# Patient Record
Sex: MALE | Race: WHITE | NOT HISPANIC OR LATINO | Employment: FULL TIME | URBAN - METROPOLITAN AREA
[De-identification: names, ages, dates, MRNs, and addresses within clinical notes are randomized per-mention and may not be internally consistent; named-entity substitution may affect disease eponyms.]

---

## 2023-03-16 ENCOUNTER — TELEMEDICINE (OUTPATIENT)
Dept: PRIMARY CARE | Facility: CLINIC | Age: 53
End: 2023-03-16
Payer: COMMERCIAL

## 2023-03-16 DIAGNOSIS — K52.9 GASTROENTERITIS: ICD-10-CM

## 2023-03-16 DIAGNOSIS — K21.9 GASTROESOPHAGEAL REFLUX DISEASE WITHOUT ESOPHAGITIS: ICD-10-CM

## 2023-03-16 DIAGNOSIS — K80.20 CALCULUS OF GALLBLADDER WITHOUT CHOLECYSTITIS WITHOUT OBSTRUCTION: Primary | ICD-10-CM

## 2023-03-16 DIAGNOSIS — E78.5 DYSLIPIDEMIA: ICD-10-CM

## 2023-03-16 PROBLEM — F52.21 ED (ERECTILE DYSFUNCTION) OF NON-ORGANIC ORIGIN: Status: ACTIVE | Noted: 2023-03-16

## 2023-03-16 PROBLEM — K27.9 PUD (PEPTIC ULCER DISEASE): Status: ACTIVE | Noted: 2023-03-16

## 2023-03-16 PROBLEM — I10 BENIGN ESSENTIAL HTN: Status: ACTIVE | Noted: 2023-03-16

## 2023-03-16 PROBLEM — R10.9 ABDOMINAL PAIN: Status: ACTIVE | Noted: 2023-03-12

## 2023-03-16 PROBLEM — K29.70 GASTRITIS: Status: ACTIVE | Noted: 2023-03-16

## 2023-03-16 PROBLEM — G47.33 OSA (OBSTRUCTIVE SLEEP APNEA): Status: ACTIVE | Noted: 2023-03-16

## 2023-03-16 PROBLEM — R10.13 EPIGASTRIC DISCOMFORT: Status: ACTIVE | Noted: 2023-03-16

## 2023-03-16 PROBLEM — E78.00 HYPERCHOLESTEROLEMIA: Chronic | Status: ACTIVE | Noted: 2023-03-12

## 2023-03-16 PROBLEM — R11.2 NAUSEA AND VOMITING: Status: ACTIVE | Noted: 2023-03-12

## 2023-03-16 PROCEDURE — 99214 OFFICE O/P EST MOD 30 MIN: CPT | Performed by: INTERNAL MEDICINE

## 2023-03-16 RX ORDER — METOPROLOL SUCCINATE 50 MG/1
1 TABLET, EXTENDED RELEASE ORAL DAILY
COMMUNITY
Start: 2023-02-03 | End: 2023-05-11

## 2023-03-16 RX ORDER — LOSARTAN POTASSIUM 50 MG/1
50 TABLET ORAL
COMMUNITY
End: 2023-05-11 | Stop reason: SDUPTHER

## 2023-03-16 RX ORDER — PANTOPRAZOLE SODIUM 40 MG/1
40 TABLET, DELAYED RELEASE ORAL 2 TIMES DAILY
Qty: 60 TABLET | Refills: 1 | Status: SHIPPED | OUTPATIENT
Start: 2023-03-16 | End: 2023-04-10

## 2023-03-16 RX ORDER — ROSUVASTATIN CALCIUM 10 MG/1
10 TABLET, COATED ORAL
COMMUNITY
Start: 2017-11-08 | End: 2023-08-30

## 2023-03-16 RX ORDER — SILDENAFIL CITRATE 20 MG/1
TABLET ORAL
COMMUNITY
Start: 2019-08-16 | End: 2023-11-21 | Stop reason: ALTCHOICE

## 2023-03-16 ASSESSMENT — ENCOUNTER SYMPTOMS
ABDOMINAL PAIN: 1
CONSTIPATION: 1
HEADACHES: 1
MYALGIAS: 1

## 2023-03-16 NOTE — PROGRESS NOTES
Answers submitted by the patient for this visit:  Abdominal Pain Questionnaire (Submitted on 3/16/2023)  Chief Complaint: Abdominal pain  Chronicity: new  Onset: in the past 7 days  Onset quality: sudden  Frequency: constantly  Episode duration: 96 Hours  Progression since onset: unchanged  Pain location: epigastric region  Pain - numeric: 5/10  Pain quality: aching, a sensation of fullness, sharp  Radiates to: RUQ, epigastric region, back  constipation: Yes  headaches: Yes  myalgias: Yes  Aggravated by: eating, movement, palpation  Relieved by: being still, bowel movements, recumbency  Diagnostic workup: CT scan, ultrasound

## 2023-03-16 NOTE — PROGRESS NOTES
Subjective   Mauro Alexander is a 52 y.o. male who presents for virtual visit abdominal pain.  HPI  Patient is here for virtual visit 52-year-old male with a known history of benign essential hypertension dyslipidemia patient is complaining of abdominal pain, visiting in [place] February 2023 status post eating cheese and various meats, his pain was rated 9/10 and 10/10 went to the emergency room was diagnosed with acute gastroenteritis, he was also noted to have cholelithiasis without inflammation of gallbladder walls.  He was discharged to follow with his PCP, he is able to eat with delayed emptying, requiring laxatives to go to the bathroom.  He denies fever chills admits to constipation or diarrhea admits to abdominal discomfort.    Review of Systems   10 system reviewed patient pertinent as above  Objective   Virtual visit  There were no vitals taken for this visit.   Physical Exam  Virtual visit      Assessment/Plan     Virtual visit    We discussed multiple problems    Acute gastroenteritis may have been due to infection  Possibly from food you have consumed in [place]  However the pain was severe I am concerned that she may have gallstone pancreatitis  Or just passed a gallstone    Cholelithiasis  We will refer you to surgery  Dr. Bob for evaluation    Gastroenteritis abdominal pain  Start Protonix 40 mg twice a day  Referral to gastroenterology  Dr. Deep Florence    Continue with the low-fat, low-cholesterol diet,  I recommended Mediterranean diet, which include fish, chicken, vegetables and olive oil  Exercise daily for 30 minutes at least 3 times a week  Continue home medications  per history    Hypertension  No added salt diet, do not and salt to your food  Try to exercise every other day for 30 minutes  Continue current medications      Problem List Items Addressed This Visit          Digestive    Calculus of gallbladder without cholecystitis without obstruction - Primary    Relevant  Orders    Referral to General Surgery    Gastroenteritis    Relevant Medications    pantoprazole (ProtoNix) 40 mg EC tablet    Other Relevant Orders    Referral to Gastroenterology    GERD (gastroesophageal reflux disease)       Other    Dyslipidemia         Harris Rahman MD

## 2023-04-03 ENCOUNTER — APPOINTMENT (OUTPATIENT)
Dept: PRIMARY CARE | Facility: CLINIC | Age: 53
End: 2023-04-03
Payer: COMMERCIAL

## 2023-04-03 PROBLEM — I47.29 NSVT (NONSUSTAINED VENTRICULAR TACHYCARDIA) (MULTI): Status: ACTIVE | Noted: 2023-04-03

## 2023-04-03 PROBLEM — D22.9 NUMEROUS MOLES: Status: ACTIVE | Noted: 2023-04-03

## 2023-04-03 PROBLEM — R35.1 NOCTURIA: Status: ACTIVE | Noted: 2023-04-03

## 2023-04-03 PROBLEM — I49.3 PVC (PREMATURE VENTRICULAR CONTRACTION): Status: ACTIVE | Noted: 2023-04-03

## 2023-04-03 PROBLEM — R00.2 HEART PALPITATIONS: Status: ACTIVE | Noted: 2023-04-03

## 2023-04-03 PROBLEM — K63.5 COLON POLYPS: Status: ACTIVE | Noted: 2023-04-03

## 2023-04-03 PROBLEM — Z87.898 HISTORY OF SNORING: Status: ACTIVE | Noted: 2023-04-03

## 2023-04-03 PROBLEM — R00.2 PALPITATION: Status: ACTIVE | Noted: 2023-04-03

## 2023-04-03 PROBLEM — R07.9 CHEST PAIN: Status: ACTIVE | Noted: 2023-04-03

## 2023-04-03 PROBLEM — L98.9 SKIN LESION: Status: ACTIVE | Noted: 2023-04-03

## 2023-04-03 PROBLEM — E55.9 VITAMIN D INSUFFICIENCY: Status: ACTIVE | Noted: 2023-04-03

## 2023-04-03 RX ORDER — TADALAFIL 10 MG/1
10 TABLET ORAL DAILY
COMMUNITY
End: 2023-05-11 | Stop reason: SDUPTHER

## 2023-04-23 DIAGNOSIS — K52.9 GASTROENTERITIS: ICD-10-CM

## 2023-05-01 RX ORDER — PANTOPRAZOLE SODIUM 40 MG/1
TABLET, DELAYED RELEASE ORAL
Qty: 180 TABLET | Refills: 1 | Status: SHIPPED | OUTPATIENT
Start: 2023-05-01 | End: 2023-11-21 | Stop reason: ALTCHOICE

## 2023-05-09 NOTE — PROGRESS NOTES
Subjective   Mauro Alexander is a 52 y.o. male who presents for patient is here for a follow-up.  HPI  53-year-old male with known history of essential hypertension, dyslipidemia history of cholelithiasis was seen by surgery Dr. Bob, planning to proceed with laparoscopic cholecystectomy.  Reports no major medical denies chest pain shortness of breath fever chill nausea vomiting constipation diarrhea dysuria urgency frequency, takes medication as prescribed.  Review of Systems  10 system review pertinent as above  Objective     Visit Vitals  /80   Pulse 78   Temp 36.7 °C (98.1 °F)   Resp 14      Physical Exam  HEENT: Atraumatic normocephalic the pupils are equal and round and reactive to light the sclerae nonicteric extraocular motion are intact.  Neck: Is supple without JVD no carotid bruits the trachea is midline there are no masses pulses are equal and bilateral with normal upstroke.  Skin: Normal.  Skin good texture.  Moist.  Good turgor.  No lesions, no rashes.  Lymph: No lymphadenopathy appreciated, no masses, no lesions  Lungs: Are clear to auscultation and percussion, good breath sounds bilaterally, no rhonchi, no wheezing, good diaphragmatic excursion.  Heart: Normal rate and normal rhythm S1, S2, no S3, no gallop, murmur or rub.  Abdomen: Soft, nontender, no organomegaly, good bowel sounds.    Extremities: Full range of motion, good pulses bilateral.  No cyanosis, no clubbing or edema.  Neuro: Cranial nerves II-XII are grossly intact there is no sensory or motor deficits.  Able to move all extremities.      Assessment/Plan   FBW    CBC BMP LIPIDS AST ALT Vitamin d and PSA    Acute cholelithiasis  Was seen by surgery Dr. Bob  The plan is to proceed with laparoscopic cholecystectomy  June 2023    Continue with the low-fat, low-cholesterol diet,  I recommended Mediterranean diet, which include fish, chicken, vegetables and olive oil  Exercise daily for 30 minutes at least 3 times a week  Continue  home medications    Hypertension  No added salt diet, do not and salt to your food  Try to exercise every other day for 30 minutes  Continue current medications    Gastroesophageal reflux disease  Continue current medications  Include spicy foods do not eat late at night  Do not wear tight fitting clothes  Exercise daily    SHANIKA  Nasal bipap    BMI 32.69  LSM weight loss    GERD Stable  Stopped Pantoprazole          Problem List Items Addressed This Visit          Nervous    SHANIKA (obstructive sleep apnea) - Primary    Relevant Orders    Basic Metabolic Panel    CBC       Circulatory    Benign essential HTN    Relevant Medications    losartan (Cozaar) 50 mg tablet    Other Relevant Orders    Basic Metabolic Panel       Digestive    GERD (gastroesophageal reflux disease)    Relevant Orders    CBC       Other    Dyslipidemia    Relevant Orders    Lipid Panel    AST    ALT    Nocturia    Relevant Orders    PSA     Other Visit Diagnoses       Vitamin D deficiency        Relevant Orders    Vitamin D 25-Hydroxy,Total              Harris Rahman MD

## 2023-05-11 ENCOUNTER — OFFICE VISIT (OUTPATIENT)
Dept: PRIMARY CARE | Facility: CLINIC | Age: 53
End: 2023-05-11
Payer: COMMERCIAL

## 2023-05-11 VITALS
TEMPERATURE: 98.1 F | RESPIRATION RATE: 14 BRPM | HEIGHT: 72 IN | DIASTOLIC BLOOD PRESSURE: 80 MMHG | BODY MASS INDEX: 32.64 KG/M2 | HEART RATE: 78 BPM | WEIGHT: 241 LBS | SYSTOLIC BLOOD PRESSURE: 118 MMHG

## 2023-05-11 DIAGNOSIS — E78.5 DYSLIPIDEMIA: ICD-10-CM

## 2023-05-11 DIAGNOSIS — E55.9 VITAMIN D DEFICIENCY: ICD-10-CM

## 2023-05-11 DIAGNOSIS — N52.9 ERECTILE DYSFUNCTION, UNSPECIFIED ERECTILE DYSFUNCTION TYPE: ICD-10-CM

## 2023-05-11 DIAGNOSIS — G47.33 OSA (OBSTRUCTIVE SLEEP APNEA): Primary | ICD-10-CM

## 2023-05-11 DIAGNOSIS — I10 BENIGN ESSENTIAL HTN: ICD-10-CM

## 2023-05-11 DIAGNOSIS — K21.9 GASTROESOPHAGEAL REFLUX DISEASE WITHOUT ESOPHAGITIS: ICD-10-CM

## 2023-05-11 DIAGNOSIS — R35.1 NOCTURIA: ICD-10-CM

## 2023-05-11 PROCEDURE — 3074F SYST BP LT 130 MM HG: CPT | Performed by: INTERNAL MEDICINE

## 2023-05-11 PROCEDURE — 80061 LIPID PANEL: CPT | Performed by: INTERNAL MEDICINE

## 2023-05-11 PROCEDURE — 82306 VITAMIN D 25 HYDROXY: CPT | Performed by: INTERNAL MEDICINE

## 2023-05-11 PROCEDURE — 84450 TRANSFERASE (AST) (SGOT): CPT | Performed by: INTERNAL MEDICINE

## 2023-05-11 PROCEDURE — 85025 COMPLETE CBC W/AUTO DIFF WBC: CPT | Performed by: INTERNAL MEDICINE

## 2023-05-11 PROCEDURE — 3079F DIAST BP 80-89 MM HG: CPT | Performed by: INTERNAL MEDICINE

## 2023-05-11 PROCEDURE — 84460 ALANINE AMINO (ALT) (SGPT): CPT | Performed by: INTERNAL MEDICINE

## 2023-05-11 PROCEDURE — 1036F TOBACCO NON-USER: CPT | Performed by: INTERNAL MEDICINE

## 2023-05-11 PROCEDURE — 84153 ASSAY OF PSA TOTAL: CPT | Performed by: INTERNAL MEDICINE

## 2023-05-11 PROCEDURE — 80048 BASIC METABOLIC PNL TOTAL CA: CPT | Performed by: INTERNAL MEDICINE

## 2023-05-11 PROCEDURE — 99214 OFFICE O/P EST MOD 30 MIN: CPT | Performed by: INTERNAL MEDICINE

## 2023-05-11 RX ORDER — LOSARTAN POTASSIUM 50 MG/1
75 TABLET ORAL DAILY
Qty: 135 TABLET | Refills: 3 | Status: SHIPPED | OUTPATIENT
Start: 2023-05-11 | End: 2023-07-13 | Stop reason: SDUPTHER

## 2023-05-11 RX ORDER — TADALAFIL 10 MG/1
10 TABLET ORAL DAILY
Qty: 30 TABLET | Refills: 3 | Status: SHIPPED | OUTPATIENT
Start: 2023-05-11

## 2023-05-11 ASSESSMENT — PAIN SCALES - GENERAL: PAINLEVEL: 0-NO PAIN

## 2023-06-14 ENCOUNTER — HOSPITAL ENCOUNTER (OUTPATIENT)
Dept: DATA CONVERSION | Facility: HOSPITAL | Age: 53
End: 2023-06-14
Attending: SURGERY | Admitting: SURGERY
Payer: COMMERCIAL

## 2023-06-14 DIAGNOSIS — K21.9 GASTRO-ESOPHAGEAL REFLUX DISEASE WITHOUT ESOPHAGITIS: ICD-10-CM

## 2023-06-14 DIAGNOSIS — K80.20 CALCULUS OF GALLBLADDER WITHOUT CHOLECYSTITIS WITHOUT OBSTRUCTION: ICD-10-CM

## 2023-06-14 DIAGNOSIS — K82.8 OTHER SPECIFIED DISEASES OF GALLBLADDER: ICD-10-CM

## 2023-06-14 DIAGNOSIS — K80.12 CALCULUS OF GALLBLADDER WITH ACUTE AND CHRONIC CHOLECYSTITIS WITHOUT OBSTRUCTION: ICD-10-CM

## 2023-06-14 DIAGNOSIS — E78.5 HYPERLIPIDEMIA, UNSPECIFIED: ICD-10-CM

## 2023-06-14 DIAGNOSIS — I10 ESSENTIAL (PRIMARY) HYPERTENSION: ICD-10-CM

## 2023-06-14 DIAGNOSIS — G47.33 OBSTRUCTIVE SLEEP APNEA (ADULT) (PEDIATRIC): ICD-10-CM

## 2023-07-13 DIAGNOSIS — I10 BENIGN ESSENTIAL HTN: ICD-10-CM

## 2023-07-13 RX ORDER — LOSARTAN POTASSIUM 50 MG/1
75 TABLET ORAL DAILY
Qty: 135 TABLET | Refills: 3 | Status: SHIPPED | OUTPATIENT
Start: 2023-07-13 | End: 2023-07-26

## 2023-07-14 LAB
COMPLETE PATHOLOGY REPORT: NORMAL
CONVERTED CLINICAL DIAGNOSIS-HISTORY: NORMAL
CONVERTED FINAL DIAGNOSIS: NORMAL
CONVERTED FINAL REPORT PDF LINK TO COPY AND PASTE: NORMAL
CONVERTED GROSS DESCRIPTION: NORMAL

## 2023-07-26 DIAGNOSIS — I10 BENIGN ESSENTIAL HTN: ICD-10-CM

## 2023-07-26 RX ORDER — LOSARTAN POTASSIUM 50 MG/1
50 TABLET ORAL DAILY
Qty: 90 TABLET | Refills: 2 | Status: SHIPPED | OUTPATIENT
Start: 2023-07-26

## 2023-08-30 DIAGNOSIS — E78.5 HYPERLIPIDEMIA, UNSPECIFIED: ICD-10-CM

## 2023-08-30 RX ORDER — ROSUVASTATIN CALCIUM 10 MG/1
TABLET, COATED ORAL
Qty: 90 TABLET | Refills: 2 | Status: SHIPPED | OUTPATIENT
Start: 2023-08-30

## 2023-09-30 NOTE — H&P
History of Present Illness:   History Present Illness:  Reason for surgery: chronic cholecysitis   HPI:    Here for planned lap saeid for symptomatic cholelithiasis    Allergies:        Allergies:  ·  morphine : Itching    Home Medication Review:   Home Medications Reviewed: yes     Impression/Procedure:   ·  Impression and Planned Procedure: Chronic cholecystitis, lap saeid planned       ERAS (Enhanced Recovery After Surgery):  ·  ERAS Patient: no       Physical Exam by System:    Respiratory/Thorax: Patent airways, CTAB, normal  breath sounds with good chest expansion, thorax symmetric   Cardiovascular: Regular, rate and rhythm, no murmurs,  2+ equal pulses of the extremities, normal S 1and S 2     Consent:   COVID-19 Consent:  ·  COVID-19 Risk Consent Surgeon has reviewed key risks related to the risk of martínez COVID-19 and if they contract COVID-19 what the risks are.       Electronic Signatures:  Jason Bob)  (Signed 14-Jun-2023 11:16)   Authored: History of Present Illness, Allergies, Home  Medication Review, Impression/Procedure, ERAS, Physical Exam, Consent, Note Completion      Last Updated: 14-Jun-2023 11:16 by Jason Bob)

## 2023-10-02 NOTE — OP NOTE
PROCEDURE DETAILS    Preoperative Diagnosis:  Calculus of gallbladder w/o mention of cholecystitis or obstruction, K80.20    Postoperative Diagnosis:  Calculus of gallbladder w/o mention of cholecystitis or obstruction, K80.20    Surgeon: Jason Bob  Resident/Fellow/Other Assistant: Bill Segovia    Procedure:  1.  Laparoscopic Cholecystectomy    Anesthesia: geta  Estimated Blood Loss: min  Findings: thickened GB full of stones  Specimens(s) Collected: yes,           Operative Report:   Patient was brought to the OR and placed supine on the table.  Time out was performed and we confirmed patient and procedure.  Alma Rosa operative antibiotics were  administered. General anesthesia given through ET tube.  We then prepped and draped sterilely.      I made an alma rosa-umbilical incision with scalpel and then entered the peritoneal cavity by open cut down technique.  Stay sutures of 0 vicryl placed.  Leodan trocar inserted directly.  We insufflated and placed 5mm, 30 degree camera.  Patient placed in  reverse Trendelenburg position.  5 mm ports placed under direct visualization in midline sub xiphoid and RUQ areas.      Gallbladder was visualized.  Some inflammatory adhesions taken down with blunt dissection and judicious cautery.  I was able to grab the body of the gallbladder and retract cephalad and to the right.  Gallbladder noted to be jam packed full of stones.   Meticulous dissection then performed in Calot's triangle.  Cystic duct and cystic artery structures were identified and skeletonized.  Posterior cystic plate visualized.  The critical view was achieved.  The cystic artery was divided between hemolock  clips.  Three clips placed on proximal cystic duct and one toward the gallbladder side.  We divided in between.  The gallbladder was then dissected atraumatically out of the liver bed with hook cautery.  It was then placed in the endocatch bag and extracted  ultimately through umbilical port site.  The  liver bed was inspected for hemostasis.  The shola hepatis was noted to be without bleeding or bile leakage.  Clips well secured.  We gently irrigated and aspirated the effluent.  The ports were removed under  direct visualization.  We desufflated and closed the umbilical fascia with 0 vicryl.  Skin incisions were closed with 4-0 biosyn and dermabond.      Patient was extubated and transferred to the PACU in stable condition.    Note Recipients:   Harris Rahman MD - [phone] [Preferred]                        Attestation:   Note Completion:  Attending Attestation I was present for the entire procedure         Electronic Signatures:  Jason Bob)  (Signed 14-Jun-2023 13:02)   Authored: Post-Operative Note, Chart Review, Note Completion      Last Updated: 14-Jun-2023 13:02 by Jason Bob)

## 2023-11-16 NOTE — PROGRESS NOTES
Subjective   Mauro Alexander is a 53 y.o. male who presents for patient is here for a physical.  RUBY Wade is here for a physical exam, he is 53-year-old male with known history of essential hypertension, dyslipidemia history of GERD, ED, cholelithiasis status post laparoscopic cholecystectomy June 14, 2023 Dr. Bob.   Reports no major medical denies chest pain shortness of breath fever chill nausea vomiting constipation diarrhea dysuria urgency frequency, takes medication as prescribed.  Review of Systems  10 system review pertinent as above  Objective     Visit Vitals  /80   Pulse 78   Temp 36.7 °C (98.1 °F)   Resp 16      Physical Exam  HEENT: Atraumatic normocephalic the pupils are equal and round and reactive to light the sclerae nonicteric extraocular motion are intact.  Neck: Is supple without JVD no carotid bruits the trachea is midline there are no masses pulses are equal and bilateral with normal upstroke.  Skin: Normal.  Skin good texture.  Moist.  Good turgor.  No lesions, no rashes.  Lymph: No lymphadenopathy appreciated, no masses, no lesions  Lungs: Are clear to auscultation and percussion, good breath sounds bilaterally, no rhonchi, no wheezing, good diaphragmatic excursion.  Heart: Normal rate and normal rhythm S1, S2, no S3, no gallop, murmur or rub.  Abdomen: Soft, nontender, no organomegaly, good bowel sounds.    Extremities: Full range of motion, good pulses bilateral.  No cyanosis, no clubbing or edema.  Neuro: Cranial nerves II-XII are grossly intact there is no sensory or motor deficits.  Able to move all extremities.      Assessment/Plan   Here for follow-up and fasting blood works    CBC BMP LIPIDS AST ALT Vitamin d and PSA    Immunizations  Flu vaccine pneumonia vaccine age 65  Shingles vaccine wishes to wait 2024  [place] vaccine 09/2023 [facility]  Flu Vaccine 09/2023 [facility]    Prevention  Colonoscopy 08/08/2023 dr Das on [facility]  PSA 05/11/2023    Continue with the  low-fat, low-cholesterol diet,  I recommended Mediterranean diet, which include fish, chicken, vegetables and olive oil  Exercise daily for 30 minutes at least 3 times a week  Continue home medications    Hypertension  No added salt diet, do not and salt to your food  Try to exercise every other day for 30 minutes  Continue current medications    Gastroesophageal reflux disease  Continue current medications  Include spicy foods do not eat late at night  Do not wear tight fitting clothes  Exercise daily    Status post laparoscopic cholecystectomy June 14, 2023    SHANIKA  Nasal bipap    BMI 32.69  LSM weight loss            Problem List Items Addressed This Visit       Benign essential HTN    Relevant Orders    Basic Metabolic Panel (Completed)    Dyslipidemia    Relevant Orders    Lipid Panel (Completed)    AST (Completed)    ALT (Completed)    Hypercholesterolemia (Chronic)    SHANIKA (obstructive sleep apnea)    GERD (gastroesophageal reflux disease)    Relevant Orders    CBC (Completed)    Vitamin D insufficiency    Relevant Orders    Vitamin D 25-Hydroxy,Total (for eval of Vitamin D levels) (Completed)    Physical exam - Primary       Harris Rahman MD

## 2023-11-19 ASSESSMENT — PROMIS GLOBAL HEALTH SCALE
RATE_GENERAL_HEALTH: GOOD
RATE_SOCIAL_SATISFACTION: GOOD
CARRYOUT_SOCIAL_ACTIVITIES: VERY GOOD
RATE_AVERAGE_PAIN: 1
RATE_QUALITY_OF_LIFE: GOOD
RATE_AVERAGE_FATIGUE: MODERATE
RATE_PHYSICAL_HEALTH: GOOD
CARRYOUT_PHYSICAL_ACTIVITIES: COMPLETELY
RATE_MENTAL_HEALTH: VERY GOOD
EMOTIONAL_PROBLEMS: SOMETIMES

## 2023-11-21 ENCOUNTER — OFFICE VISIT (OUTPATIENT)
Dept: PRIMARY CARE | Facility: CLINIC | Age: 53
End: 2023-11-21
Payer: COMMERCIAL

## 2023-11-21 VITALS
SYSTOLIC BLOOD PRESSURE: 121 MMHG | HEART RATE: 78 BPM | RESPIRATION RATE: 16 BRPM | DIASTOLIC BLOOD PRESSURE: 80 MMHG | TEMPERATURE: 98.1 F | HEIGHT: 72 IN | WEIGHT: 251 LBS | BODY MASS INDEX: 34 KG/M2

## 2023-11-21 DIAGNOSIS — E55.9 VITAMIN D INSUFFICIENCY: ICD-10-CM

## 2023-11-21 DIAGNOSIS — G47.33 OSA (OBSTRUCTIVE SLEEP APNEA): ICD-10-CM

## 2023-11-21 DIAGNOSIS — I10 BENIGN ESSENTIAL HTN: ICD-10-CM

## 2023-11-21 DIAGNOSIS — E78.00 HYPERCHOLESTEROLEMIA: Chronic | ICD-10-CM

## 2023-11-21 DIAGNOSIS — E78.5 DYSLIPIDEMIA: ICD-10-CM

## 2023-11-21 DIAGNOSIS — K21.9 GASTROESOPHAGEAL REFLUX DISEASE WITHOUT ESOPHAGITIS: ICD-10-CM

## 2023-11-21 DIAGNOSIS — Z00.00 PHYSICAL EXAM: Primary | ICD-10-CM

## 2023-11-21 PROCEDURE — 80061 LIPID PANEL: CPT | Performed by: INTERNAL MEDICINE

## 2023-11-21 PROCEDURE — 82306 VITAMIN D 25 HYDROXY: CPT | Performed by: INTERNAL MEDICINE

## 2023-11-21 PROCEDURE — 1036F TOBACCO NON-USER: CPT | Performed by: INTERNAL MEDICINE

## 2023-11-21 PROCEDURE — 85025 COMPLETE CBC W/AUTO DIFF WBC: CPT | Performed by: INTERNAL MEDICINE

## 2023-11-21 PROCEDURE — 3074F SYST BP LT 130 MM HG: CPT | Performed by: INTERNAL MEDICINE

## 2023-11-21 PROCEDURE — 84460 ALANINE AMINO (ALT) (SGPT): CPT | Performed by: INTERNAL MEDICINE

## 2023-11-21 PROCEDURE — 3079F DIAST BP 80-89 MM HG: CPT | Performed by: INTERNAL MEDICINE

## 2023-11-21 PROCEDURE — 80048 BASIC METABOLIC PNL TOTAL CA: CPT | Performed by: INTERNAL MEDICINE

## 2023-11-21 PROCEDURE — 99214 OFFICE O/P EST MOD 30 MIN: CPT | Performed by: INTERNAL MEDICINE

## 2023-11-21 PROCEDURE — 84450 TRANSFERASE (AST) (SGOT): CPT | Performed by: INTERNAL MEDICINE

## 2023-11-21 ASSESSMENT — PAIN SCALES - GENERAL: PAINLEVEL: 0-NO PAIN

## 2023-11-21 ASSESSMENT — ENCOUNTER SYMPTOMS
DEPRESSION: 0
LOSS OF SENSATION IN FEET: 0
OCCASIONAL FEELINGS OF UNSTEADINESS: 0

## 2024-08-16 DIAGNOSIS — E78.5 HYPERLIPIDEMIA, UNSPECIFIED: ICD-10-CM

## 2024-08-16 RX ORDER — ROSUVASTATIN CALCIUM 10 MG/1
TABLET, COATED ORAL
Qty: 90 TABLET | Refills: 2 | Status: SHIPPED | OUTPATIENT
Start: 2024-08-16

## 2024-08-29 ENCOUNTER — APPOINTMENT (OUTPATIENT)
Dept: NEUROLOGY | Facility: CLINIC | Age: 54
End: 2024-08-29
Payer: COMMERCIAL

## 2024-09-04 NOTE — PROGRESS NOTES
Subjective   Mauro Alexander is a 54 y.o. male who presents for patient is here for a physical.  HPI  Mauro is here for a physical exam, he is 53-year-old male with known history of essential hypertension, dyslipidemia history of GERD, ED, cholelithiasis status post laparoscopic cholecystectomy June 14, 2023 Dr. Bob.   Reports no major medical denies chest pain shortness of breath fever chill nausea vomiting constipation diarrhea dysuria urgency frequency, takes medication as prescribed.  Review of Systems  10 system review pertinent as above  Objective     There were no vitals taken for this visit.     Physical Exam  HEENT: Atraumatic normocephalic the pupils are equal and round and reactive to light the sclerae nonicteric extraocular motion are intact.  Neck: Is supple without JVD no carotid bruits the trachea is midline there are no masses pulses are equal and bilateral with normal upstroke.  Skin: Normal.  Skin good texture.  Moist.  Good turgor.  No lesions, no rashes.  Lymph: No lymphadenopathy appreciated, no masses, no lesions  Lungs: Are clear to auscultation and percussion, good breath sounds bilaterally, no rhonchi, no wheezing, good diaphragmatic excursion.  Heart: Normal rate and normal rhythm S1, S2, no S3, no gallop, murmur or rub.  Abdomen: Soft, nontender, no organomegaly, good bowel sounds.    Extremities: Full range of motion, good pulses bilateral.  No cyanosis, no clubbing or edema.  Neuro: Cranial nerves II-XII are grossly intact there is no sensory or motor deficits.  Able to move all extremities.      Assessment/Plan   Here for Physical exam    CBC BMP LIPIDS AST ALT Vitamin d and PSA    Numbness Sole and toes  Bilateral in the settings  Spondylolisthesis  Was seen By KALYAN ( [facility])  EMG positive Periferal neuropathy  Will see Neurology   Normal Glucose, No Hx PVD  Normal Lipid panel.  B12, Folte    Immunizations  Flu vaccine pneumonia vaccine age 65  Shingles vaccine wishes to wait  2024 age 55  [place] vaccine 09/2024 [facility]  Flu Vaccine 2024  [facility]    Prevention  Colonoscopy 08/08/2023 dr Puente Next 2028  PSA 05/11/2023, PSA 2024    Continue with the low-fat, low-cholesterol diet,  I recommended Mediterranean diet, which include fish, chicken, vegetables and olive oil  Exercise daily for 30 minutes at least 3 times a week  Continue home medications    Hypertension  No added salt diet, do not and salt to your food  Try to exercise every other day for 30 minutes  Continue current medications    Gastroesophageal reflux disease  Continue current medications  Include spicy foods do not eat late at night  Do not wear tight fitting clothes  Exercise daily    Status post laparoscopic cholecystectomy June 14, 2023    SHANIKA  Nasal bipap  Using nightly    BMI 34.18 Kg/M sq  Gaining Wt   We spoke of diet and exercise    Hx Cholecystectomy  Doing better     Palpitation NS SVT  Was seen BY EP  Normal Stress test and Echo  Followed with his nurse Eric  Reduced Metoprolol still felt poorly  Now not taking   Occasional palpitation   Possible every other month  Asymptomatic no CP,SOB,Dizziness  Patient declined ablation Declined BB due to side effects    Periferal Neuropathy No claudication,  Was seen by [facility]  EMG with Periferal Neuropathy  With some weakness LLE  Check b12,FA, TSH  Will see neurology and Ortho spine referral   Normal Sress test Caduio, Nl Glucose.           Problem List Items Addressed This Visit       Benign essential HTN    Relevant Medications    losartan (Cozaar) 50 mg tablet    Dyslipidemia    Relevant Orders    Lipid Panel    AST    ALT    NSVT (nonsustained ventricular tachycardia) (Multi)    Vitamin D insufficiency    Relevant Orders    Vitamin D 25-Hydroxy,Total (for eval of Vitamin D levels)    Physical exam - Primary    H/O peripheral neuropathy    Relevant Orders    Vitamin B12    Folate    TSH     Other Visit Diagnoses       Spondylolisthesis of lumbar region         Relevant Orders    Referral to Orthopaedic Surgery              Harris Rahman MD

## 2024-09-06 ENCOUNTER — APPOINTMENT (OUTPATIENT)
Dept: PRIMARY CARE | Facility: CLINIC | Age: 54
End: 2024-09-06
Payer: COMMERCIAL

## 2024-09-06 VITALS — WEIGHT: 252 LBS | BODY MASS INDEX: 34.18 KG/M2

## 2024-09-06 DIAGNOSIS — M25.522 LEFT ELBOW PAIN: ICD-10-CM

## 2024-09-06 DIAGNOSIS — Z00.00 PHYSICAL EXAM: Primary | ICD-10-CM

## 2024-09-06 DIAGNOSIS — M43.16 SPONDYLOLISTHESIS OF LUMBAR REGION: ICD-10-CM

## 2024-09-06 DIAGNOSIS — E55.9 VITAMIN D INSUFFICIENCY: ICD-10-CM

## 2024-09-06 DIAGNOSIS — I47.29 NSVT (NONSUSTAINED VENTRICULAR TACHYCARDIA) (MULTI): ICD-10-CM

## 2024-09-06 DIAGNOSIS — I10 BENIGN ESSENTIAL HTN: ICD-10-CM

## 2024-09-06 DIAGNOSIS — E78.5 DYSLIPIDEMIA: ICD-10-CM

## 2024-09-06 DIAGNOSIS — Z86.69 H/O PERIPHERAL NEUROPATHY: ICD-10-CM

## 2024-09-06 PROBLEM — R10.13 EPIGASTRIC DISCOMFORT: Status: RESOLVED | Noted: 2023-03-16 | Resolved: 2024-09-06

## 2024-09-06 PROBLEM — L98.9 SKIN LESION: Status: RESOLVED | Noted: 2023-04-03 | Resolved: 2024-09-06

## 2024-09-06 PROBLEM — K29.70 GASTRITIS: Status: RESOLVED | Noted: 2023-03-16 | Resolved: 2024-09-06

## 2024-09-06 PROBLEM — R11.2 NAUSEA AND VOMITING: Status: RESOLVED | Noted: 2023-03-12 | Resolved: 2024-09-06

## 2024-09-06 PROBLEM — R07.9 CHEST PAIN: Status: RESOLVED | Noted: 2023-04-03 | Resolved: 2024-09-06

## 2024-09-06 PROBLEM — K27.9 PUD (PEPTIC ULCER DISEASE): Status: RESOLVED | Noted: 2023-03-16 | Resolved: 2024-09-06

## 2024-09-06 PROBLEM — K21.9 GERD (GASTROESOPHAGEAL REFLUX DISEASE): Status: RESOLVED | Noted: 2023-03-16 | Resolved: 2024-09-06

## 2024-09-06 PROBLEM — R35.1 NOCTURIA: Status: RESOLVED | Noted: 2023-04-03 | Resolved: 2024-09-06

## 2024-09-06 PROBLEM — I49.3 PVC (PREMATURE VENTRICULAR CONTRACTION): Status: RESOLVED | Noted: 2023-04-03 | Resolved: 2024-09-06

## 2024-09-06 PROBLEM — R00.2 PALPITATION: Status: RESOLVED | Noted: 2023-04-03 | Resolved: 2024-09-06

## 2024-09-06 PROBLEM — K52.9 GASTROENTERITIS: Status: RESOLVED | Noted: 2023-03-16 | Resolved: 2024-09-06

## 2024-09-06 PROBLEM — R10.9 ABDOMINAL PAIN: Status: RESOLVED | Noted: 2023-03-12 | Resolved: 2024-09-06

## 2024-09-06 PROBLEM — K63.5 COLON POLYPS: Status: RESOLVED | Noted: 2023-04-03 | Resolved: 2024-09-06

## 2024-09-06 PROBLEM — E78.00 HYPERCHOLESTEROLEMIA: Chronic | Status: RESOLVED | Noted: 2023-03-12 | Resolved: 2024-09-06

## 2024-09-06 LAB
25(OH)D3 SERPL-MCNC: 45 NG/ML (ref 30–100)
ALT SERPL W P-5'-P-CCNC: 24 U/L (ref 14–59)
AST SERPL W P-5'-P-CCNC: 22 U/L (ref 15–37)
CHOLEST SERPL-MCNC: 170 MG/DL (ref 0–199)
CHOLESTEROL/HDL RATIO: 3.6 (ref 4.2–7)
FOLATE SERPL-MCNC: 21.3 NG/ML (ref 8.6–58.9)
HDLC SERPL-MCNC: 47 MG/DL (ref 40–59)
IS PATIENT FASTING: ABNORMAL
LDLC SERPL DIRECT ASSAY-MCNC: 99 MG/DL (ref 0–100)
TRIGL SERPL-MCNC: 89 MG/DL
TSH SERPL-ACNC: 1.97 MIU/L (ref 0.44–3.98)
VIT B12 SERPL-MCNC: 457 PG/ML (ref 193–986)

## 2024-09-06 PROCEDURE — 83721 ASSAY OF BLOOD LIPOPROTEIN: CPT | Performed by: INTERNAL MEDICINE

## 2024-09-06 PROCEDURE — 99396 PREV VISIT EST AGE 40-64: CPT | Performed by: INTERNAL MEDICINE

## 2024-09-06 PROCEDURE — 82306 VITAMIN D 25 HYDROXY: CPT | Performed by: INTERNAL MEDICINE

## 2024-09-06 RX ORDER — LOSARTAN POTASSIUM 50 MG/1
75 TABLET ORAL DAILY
Qty: 135 TABLET | Refills: 3 | Status: SHIPPED | OUTPATIENT
Start: 2024-09-06 | End: 2025-09-06

## 2024-09-06 RX ORDER — LOSARTAN POTASSIUM 50 MG/1
75 TABLET ORAL DAILY
Start: 2024-09-06 | End: 2024-09-06 | Stop reason: SDUPTHER

## 2024-09-06 ASSESSMENT — ENCOUNTER SYMPTOMS
LOSS OF SENSATION IN FEET: 0
OCCASIONAL FEELINGS OF UNSTEADINESS: 0
DEPRESSION: 0

## 2024-09-09 ENCOUNTER — TRANSCRIBE ORDERS (OUTPATIENT)
Dept: ORTHOPEDIC SURGERY | Facility: HOSPITAL | Age: 54
End: 2024-09-09
Payer: COMMERCIAL

## 2024-09-09 DIAGNOSIS — M54.50 LOW BACK PAIN, UNSPECIFIED BACK PAIN LATERALITY, UNSPECIFIED CHRONICITY, UNSPECIFIED WHETHER SCIATICA PRESENT: ICD-10-CM

## 2024-09-13 ENCOUNTER — HOSPITAL ENCOUNTER (OUTPATIENT)
Dept: RADIOLOGY | Facility: CLINIC | Age: 54
Discharge: HOME | End: 2024-09-13
Payer: COMMERCIAL

## 2024-09-13 ENCOUNTER — APPOINTMENT (OUTPATIENT)
Dept: ORTHOPEDIC SURGERY | Facility: CLINIC | Age: 54
End: 2024-09-13
Payer: COMMERCIAL

## 2024-09-13 VITALS — WEIGHT: 252 LBS | BODY MASS INDEX: 34.13 KG/M2 | HEIGHT: 72 IN

## 2024-09-13 DIAGNOSIS — M54.16 LUMBAR RADICULOPATHY: Primary | ICD-10-CM

## 2024-09-13 DIAGNOSIS — M54.50 LOW BACK PAIN, UNSPECIFIED BACK PAIN LATERALITY, UNSPECIFIED CHRONICITY, UNSPECIFIED WHETHER SCIATICA PRESENT: ICD-10-CM

## 2024-09-13 DIAGNOSIS — M48.062 LUMBAR STENOSIS WITH NEUROGENIC CLAUDICATION: ICD-10-CM

## 2024-09-13 DIAGNOSIS — M43.10 ACQUIRED SPONDYLOLISTHESIS: ICD-10-CM

## 2024-09-13 DIAGNOSIS — M43.16 SPONDYLOLISTHESIS OF LUMBAR REGION: ICD-10-CM

## 2024-09-13 PROCEDURE — 99203 OFFICE O/P NEW LOW 30 MIN: CPT | Performed by: ORTHOPAEDIC SURGERY

## 2024-09-13 PROCEDURE — 72110 X-RAY EXAM L-2 SPINE 4/>VWS: CPT

## 2024-09-13 PROCEDURE — 3008F BODY MASS INDEX DOCD: CPT | Performed by: ORTHOPAEDIC SURGERY

## 2024-09-13 ASSESSMENT — PAIN - FUNCTIONAL ASSESSMENT: PAIN_FUNCTIONAL_ASSESSMENT: 0-10

## 2024-09-16 NOTE — PROGRESS NOTES
HPI:Mauro Alexander is a 54-year-old man, who comes in with chief complaint of numbness and tingling in his feet.  He had an EMG done at an outside facility consistent with peripheral neuropathy.  He also has some chronic L4-L5 radicular symptoms on the EMG.  He denies any specific leg pain at this time.      ROS:  Reviewed on EMR and patient intake sheet.    PMH/SH:  Reviewed on EMR and patient intake sheet.    Exam:  Physical Exam    Constitutional: Well appearing; no acute distress  Eyes: pupils are equal and round  Psych: normal affect  Respiratory: non-labored breathing  Cardiovascular: regular rate and rhythm  GI: non-distended abdomen  Musculoskeletal: no pain with range of motion of the hips bilaterally  Neurologic: [5]/5 strength in the lower extremities bilaterally]; [negative] straight leg raise    Radiology:     MRI demonstrates a L4-5 spondylolisthesis grade 2 with resulting severe foraminal narrowing.    Diagnosis:    Lumbar radiculopathy; degenerative spondylolisthesis; peripheral neuropathy    Assessment and Plan:   54-year-old man with chronic numbness and tingling in his feet related to his peripheral neuropathy.  Although he does have severe foraminal narrowing at L4-5 in conjunction with a spondylolisthesis, surgical intervention would not correct the chronic numbness in his feet.  In the absence of radicular pain, I have recommended continued nonoperative management.  He will follow-up as necessary.    The patient was in agreement with the plan. At the end of the visit today, the patient felt that all questions had been answered satisfactorily.  The patient was pleased with the visit and very appreciative for the care rendered.     Thank you very much for the kind referral.  It is a privilege, and a pleasure, to partner with you in the care of your patients.  I would be delighted to assist you with any further consultations as needed.          Deep Porter MD    Chief of Spine Surgery,  [facility]  Director of Spine Service, [facility]  , Department of Orthopaedics  [facility] [employer]  [id number] [place] [address]  P: [phone]  [email or web address]    This note was dictated with voice recognition software.  It has not been proofread for grammatical errors, typographical mistakes or other semantic inconsistencies.

## 2024-10-02 DIAGNOSIS — I10 BENIGN ESSENTIAL HTN: ICD-10-CM

## 2024-10-02 RX ORDER — LOSARTAN POTASSIUM 50 MG/1
50 TABLET ORAL DAILY
Qty: 90 TABLET | Refills: 2 | Status: SHIPPED | OUTPATIENT
Start: 2024-10-02

## 2024-10-21 NOTE — PROGRESS NOTES
Date of Service: 10/22/2024  Patient: Mauro Alexander  MRN: [id number]  Referring Provider: No ref. provider found  PCP: Harris Rahman MD    History of Present Illness:   Mr. Alexander is a 54 y.o. male who presents to neurology clinic for evaluation of numbness in toes. Mauro Alexander's past medical history is pertinent for HTN, SHANIKA.    He reports the bottom of his feet and toes are numb and has paresthesias in the distal feet. Symptoms have been present for at least a year. Sometimes he feels like he might trip when walking, but no overt weakness. No symptoms in the hands or upper extremities. He also has a history of back pain. No radicular pain down the legs.     He was evaluated at [facility] orthopedics due to the foot symptoms and underwent an EMG of the bilateral lower extremities. This was personally reviewed and showed a bilateral L4 radiculopathy with active denervation along with a mild sensorimotor axonal peripheral polyneuropathy.     Diabetes: No recent testing  Alcohol: A few drinks a week  Toxic exposure: No  Family History: No    Review of Systems:  The systems were reviewed with pertinent positives and negatives documented in the HPI.      Past Medical & Surgical History  Past Medical History:   Diagnosis Date    Other chest pain 04/29/2016    Atypical chest pain    Snoring 11/06/2017    Snoring     Past Surgical History:   Procedure Laterality Date    SEPTOPLASTY  11/10/2015    Septoplasty     Social History:   Social History     Tobacco Use    Smoking status: Never     Passive exposure: Never    Smokeless tobacco: Never   Substance Use Topics    Alcohol use: Never     Family History:   No known family history of neuromuscular disorders     Medications:     Current Outpatient Medications:     losartan (Cozaar) 50 mg tablet, TAKE 1 TABLET BY MOUTH EVERY DAY, Disp: 90 tablet, Rfl: 2    rosuvastatin (Crestor) 10 mg tablet, TAKE 1 TABLET BY MOUTH DAILY EXCEPT SUNDAYS, Disp: 90 tablet, Rfl: 2    tadalafil  (Cialis) 10 mg tablet, Take 1 tablet (10 mg) by mouth once daily., Disp: 30 tablet, Rfl: 3     General Physical Exam:  There were no vitals taken for this visit.     He looks well and is not in any acute distress. Breathing comfortably on room air.     Neurological Exam:   Mental status reveals him to be alert and oriented. Speech is intact to conversation. Fund of knowledge is normal.     Cranial nerves:  CN 2   Visual fields full to confrontation.   CN 3, 4, 6   Pupils round, 4 mm in diameter, equally reactive to light. Lids symmetric; no ptosis. EOMs normal alignment, full range.   No nystagmus.   CN 5   Facial sensation intact bilaterally.   CN 7   Normal and symmetric facial strength. Nasolabial folds symmetric.   CN 8   Hearing intact to conversation.   CN 9   Palate elevates symmetrically.   CN 11   Normal strength of shoulder shrug and neck turning.   CN 12   Tongue midline, with normal bulk and strength; no fasciculations.      Motor:  Muscle bulk: Normal throughout.  Muscle tone: Normal in both upper and lower extremities.  Movements: No fasciculations, tremors or other abnormal movement.    R L   5 5 Shoulder abduction  5 5 Elbow flexion  5 5 Elbow extension  5 5 Finger flexion  5 5 Finger extension  5 5 Finger abduction  5 5 Hip flexion  5 5 Hip extension  5 5 Hip abduction (with hip flexed)  5 5 Knee flexion  5 5 Knee extension  5 5 Ankle dorsiflexion  5 5 Ankle plantarflexion  5 5 Big toe extension  5 5 Toe flexion     Reflexes                         R     L  Triceps          2      2  Biceps           2      2  Brachiorad    2      2  Patellar         2      2   Achilles         2      2    Babinski: toes downgoing to plantar stimulation. No clonus or other pathologic reflexes present.      Sensory:   Light Touch: Decreased at the dorsal and plantar surface of the distal feet bilaterally  Pin: Decreased at the dorsal and plantar surface of the distal feet bilaterally  Position: Normal at great toes  bilaterally  Vibration: Impaired at great toes compared to ankles  Temperature: Patchy decreased in left calf otherwise intact.     Coordination:  In both upper extremities, finger-nose-finger was intact without dysmetria or overshoot.   In both lower extremities, heel-to-shin was intact.     Gait:  Station was stable with a normal base. Gait was stable with a normal arm swing and speed. No ataxia, shuffling, steppage or waddling was present. No circumduction was present.  Able to toe walk and heel walk.  Tandem gait was intact. No Romberg sign was present.    Results:    Lab Results   Component Value Date    [id number] 09/06/2024     Lab Results   Component Value Date    TSH 1.97 09/06/2024      BMP:   Lab Results   Component Value Date    MG 2.34 02/16/2022     LFT:   Lab Results   Component Value Date    ALT 24 09/06/2024    AST 22 09/06/2024     EMG:  EMG completed at [facility] on 12/13/2023 was reviewed.  The left and right peroneal motor responses are reduced with mildly slowed conduction velocities.  The sural sensory response is absent bilaterally.  On needle EMG there is active denervation with mild chronic neurogenic changes in the right anterior tibialis, left vastus lateralis and left anterior tibialis muscles.    Imaging:  MRI lumbar spine completed with [facility] was reviewed.  The patient brought the imaging on a CD.  There is evidence of spondylolisthesis of L4 and L5 with compression of the exiting L for nerve roots.  There is a disc bulge to the left lateral recess at L5-S1 abutting the S1 nerve root.    Impression:  Mauro Alexander is a 54 y.o. who presents with bilateral distal foot numbness and paresthesias.  On neurological exam there is diminished pinprick and vibration in the distal feet.  Strength is full in all extremities.  He has intact ankle reflexes.      EMG was completed through [facility] which shows evidence of a mild  sensorimotor peripheral polyneuropathy along with an L4 radiculopathy with active denervation.      MRI spine through [facility] shows grade 1 spondylolisthesis of L4 on L5 with compression of the exiting L4 nerve roots.  He was evaluated by orthopedic surgery through [facility] and it was felt his symptoms were more likely related to the peripheral neuropathy than the L4 radiculopathy seen on EMG.      I agree that the majority of his symptoms are due to the underlying peripheral neuropathy.  Workup thus far reveals normal TSH and vitamin B12.  Blood work is sent for evaluation of other etiologies.  We discussed neuropathic pain treatment, but he is not interested at this time as symptoms are tolerable.    Plan:    Peripheral polyneuropathy  -     CBC and Auto Differential; Future  -     Comprehensive Metabolic Panel; Future  -     Hemoglobin A1C; Future  -     Methylmalonic Acid; Future  -     Serum Protein Electrophoresis + Immunofixation; Future  -     Kohler/Lambda Free Light Chain, Serum; Future  -     SHINE + ARCHIE Panel; Future  -     Vitamin B6; Future    I will notify him of the results of his blood work.    Reviewed and approved by KIMBERLY SCOTT on 10/21/24 at 1:38 PM.    I personally spent 40 minutes on the day of the visit completing the review of the medical record and outside records, obtaining history and performing an appropriate physical exam, patient care, counseling and education, placing orders, independently reviewing results, communicating with the patient, coordinating care and performing appropriate clinical documentation.

## 2024-10-22 ENCOUNTER — APPOINTMENT (OUTPATIENT)
Dept: NEUROLOGY | Facility: CLINIC | Age: 54
End: 2024-10-22
Payer: COMMERCIAL

## 2024-10-22 ENCOUNTER — LAB (OUTPATIENT)
Dept: LAB | Facility: LAB | Age: 54
End: 2024-10-22
Payer: COMMERCIAL

## 2024-10-22 VITALS
BODY MASS INDEX: 34 KG/M2 | DIASTOLIC BLOOD PRESSURE: 86 MMHG | SYSTOLIC BLOOD PRESSURE: 136 MMHG | WEIGHT: 251 LBS | TEMPERATURE: 97.6 F | HEIGHT: 72 IN | HEART RATE: 75 BPM

## 2024-10-22 DIAGNOSIS — G60.9 IDIOPATHIC POLYNEUROPATHY: Primary | ICD-10-CM

## 2024-10-22 DIAGNOSIS — G60.9 IDIOPATHIC POLYNEUROPATHY: ICD-10-CM

## 2024-10-22 LAB
ALBUMIN SERPL BCP-MCNC: 4.3 G/DL (ref 3.4–5)
ALP SERPL-CCNC: 72 U/L (ref 33–120)
ALT SERPL W P-5'-P-CCNC: 25 U/L (ref 10–52)
ANION GAP SERPL CALC-SCNC: 12 MMOL/L (ref 10–20)
AST SERPL W P-5'-P-CCNC: 19 U/L (ref 9–39)
BASOPHILS # BLD AUTO: 0.05 X10*3/UL (ref 0–0.1)
BASOPHILS NFR BLD AUTO: 0.7 %
BILIRUB SERPL-MCNC: 0.6 MG/DL (ref 0–1.2)
BUN SERPL-MCNC: 17 MG/DL (ref 6–23)
CALCIUM SERPL-MCNC: 9.5 MG/DL (ref 8.6–10.3)
CHLORIDE SERPL-SCNC: 104 MMOL/L (ref 98–107)
CO2 SERPL-SCNC: 26 MMOL/L (ref 21–32)
CREAT SERPL-MCNC: 0.87 MG/DL (ref 0.5–1.3)
EGFRCR SERPLBLD CKD-EPI 2021: >90 ML/MIN/1.73M*2
EOSINOPHIL # BLD AUTO: 0.14 X10*3/UL (ref 0–0.7)
EOSINOPHIL NFR BLD AUTO: 1.9 %
ERYTHROCYTE [DISTWIDTH] IN BLOOD BY AUTOMATED COUNT: 12.8 % (ref 11.5–14.5)
GLUCOSE SERPL-MCNC: 94 MG/DL (ref 74–99)
HCT VFR BLD AUTO: 46.7 % (ref 41–52)
HGB BLD-MCNC: 15.4 G/DL (ref 13.5–17.5)
IMM GRANULOCYTES # BLD AUTO: 0.01 X10*3/UL (ref 0–0.7)
IMM GRANULOCYTES NFR BLD AUTO: 0.1 % (ref 0–0.9)
LYMPHOCYTES # BLD AUTO: 2.02 X10*3/UL (ref 1.2–4.8)
LYMPHOCYTES NFR BLD AUTO: 27.9 %
MCH RBC QN AUTO: 29.6 PG (ref 26–34)
MCHC RBC AUTO-ENTMCNC: 33 G/DL (ref 32–36)
MCV RBC AUTO: 90 FL (ref 80–100)
MONOCYTES # BLD AUTO: 0.53 X10*3/UL (ref 0.1–1)
MONOCYTES NFR BLD AUTO: 7.3 %
NEUTROPHILS # BLD AUTO: 4.48 X10*3/UL (ref 1.2–7.7)
NEUTROPHILS NFR BLD AUTO: 62.1 %
NRBC BLD-RTO: 0 /100 WBCS (ref 0–0)
PLATELET # BLD AUTO: 304 X10*3/UL (ref 150–450)
POTASSIUM SERPL-SCNC: 4.1 MMOL/L (ref 3.5–5.3)
PROT SERPL-MCNC: 6.7 G/DL (ref 6.4–8.2)
RBC # BLD AUTO: 5.21 X10*6/UL (ref 4.5–5.9)
SODIUM SERPL-SCNC: 138 MMOL/L (ref 136–145)
WBC # BLD AUTO: 7.2 X10*3/UL (ref 4.4–11.3)

## 2024-10-22 PROCEDURE — 86320 SERUM IMMUNOELECTROPHORESIS: CPT | Performed by: STUDENT IN AN ORGANIZED HEALTH CARE EDUCATION/TRAINING PROGRAM

## 2024-10-22 PROCEDURE — 80053 COMPREHEN METABOLIC PANEL: CPT

## 2024-10-22 PROCEDURE — 3008F BODY MASS INDEX DOCD: CPT | Performed by: STUDENT IN AN ORGANIZED HEALTH CARE EDUCATION/TRAINING PROGRAM

## 2024-10-22 PROCEDURE — 83521 IG LIGHT CHAINS FREE EACH: CPT

## 2024-10-22 PROCEDURE — 86038 ANTINUCLEAR ANTIBODIES: CPT

## 2024-10-22 PROCEDURE — 86334 IMMUNOFIX E-PHORESIS SERUM: CPT

## 2024-10-22 PROCEDURE — 86235 NUCLEAR ANTIGEN ANTIBODY: CPT

## 2024-10-22 PROCEDURE — 1036F TOBACCO NON-USER: CPT | Performed by: STUDENT IN AN ORGANIZED HEALTH CARE EDUCATION/TRAINING PROGRAM

## 2024-10-22 PROCEDURE — 84165 PROTEIN E-PHORESIS SERUM: CPT

## 2024-10-22 PROCEDURE — 3075F SYST BP GE 130 - 139MM HG: CPT | Performed by: STUDENT IN AN ORGANIZED HEALTH CARE EDUCATION/TRAINING PROGRAM

## 2024-10-22 PROCEDURE — 86225 DNA ANTIBODY NATIVE: CPT

## 2024-10-22 PROCEDURE — 99204 OFFICE O/P NEW MOD 45 MIN: CPT | Performed by: STUDENT IN AN ORGANIZED HEALTH CARE EDUCATION/TRAINING PROGRAM

## 2024-10-22 PROCEDURE — 83036 HEMOGLOBIN GLYCOSYLATED A1C: CPT

## 2024-10-22 PROCEDURE — 36415 COLL VENOUS BLD VENIPUNCTURE: CPT

## 2024-10-22 PROCEDURE — 84155 ASSAY OF PROTEIN SERUM: CPT

## 2024-10-22 PROCEDURE — 84165 PROTEIN E-PHORESIS SERUM: CPT | Performed by: STUDENT IN AN ORGANIZED HEALTH CARE EDUCATION/TRAINING PROGRAM

## 2024-10-22 PROCEDURE — 84207 ASSAY OF VITAMIN B-6: CPT

## 2024-10-22 PROCEDURE — 3079F DIAST BP 80-89 MM HG: CPT | Performed by: STUDENT IN AN ORGANIZED HEALTH CARE EDUCATION/TRAINING PROGRAM

## 2024-10-22 PROCEDURE — 83921 ORGANIC ACID SINGLE QUANT: CPT

## 2024-10-22 PROCEDURE — 85025 COMPLETE CBC W/AUTO DIFF WBC: CPT

## 2024-10-22 ASSESSMENT — PAIN SCALES - GENERAL: PAINLEVEL_OUTOF10: 0-NO PAIN

## 2024-10-22 NOTE — PATIENT INSTRUCTIONS
It was a pleasure seeing you today.    It was a pleasure seeing you today. You were evaluated for neuropathy.    I have ordered blood work to look for reversible causes of neuropathy.    If you have any questions or concerns please call my office at [phone].

## 2024-10-23 LAB
ANA SER QL HEP2 SUBST: NEGATIVE
CENTROMERE B AB SER-ACNC: <0.2 AI
CHROMATIN AB SERPL-ACNC: <0.2 AI
DSDNA AB SER-ACNC: <1 IU/ML
ENA JO1 AB SER QL IA: <0.2 AI
ENA RNP AB SER IA-ACNC: <0.2 AI
ENA SCL70 AB SER QL IA: <0.2 AI
ENA SM AB SER IA-ACNC: <0.2 AI
ENA SM+RNP AB SER QL IA: <0.2 AI
ENA SS-A AB SER IA-ACNC: 0.4 AI
ENA SS-B AB SER IA-ACNC: <0.2 AI
EST. AVERAGE GLUCOSE BLD GHB EST-MCNC: 111 MG/DL
HBA1C MFR BLD: 5.5 %
KAPPA LC SERPL-MCNC: 1.28 MG/DL (ref 0.33–1.94)
KAPPA LC/LAMBDA SER: 1.31 {RATIO} (ref 0.26–1.65)
LAMBDA LC SERPL-MCNC: 0.98 MG/DL (ref 0.57–2.63)
PROT SERPL-MCNC: 7.1 G/DL (ref 6.4–8.2)
RIBOSOMAL P AB SER-ACNC: <0.2 AI

## 2024-10-25 LAB
ALBUMIN: 4.4 G/DL (ref 3.4–5)
ALPHA 1 GLOBULIN: 0.3 G/DL (ref 0.2–0.6)
ALPHA 2 GLOBULIN: 0.7 G/DL (ref 0.4–1.1)
BETA GLOBULIN: 0.8 G/DL (ref 0.5–1.2)
GAMMA GLOBULIN: 0.9 G/DL (ref 0.5–1.4)
IMMUNOFIXATION COMMENT: NORMAL
PATH REVIEW - SERUM IMMUNOFIXATION: NORMAL
PATH REVIEW-SERUM PROTEIN ELECTROPHORESIS: NORMAL
PROTEIN ELECTROPHORESIS COMMENT: NORMAL

## 2024-10-26 LAB
METHYLMALONATE SERPL-SCNC: 0.13 UMOL/L (ref 0–0.4)
PYRIDOXAL PHOS SERPL-SCNC: 217.3 NMOL/L (ref 20–125)

## 2024-10-28 DIAGNOSIS — G60.9 IDIOPATHIC POLYNEUROPATHY: ICD-10-CM

## 2024-10-28 DIAGNOSIS — E67.2 HYPERVITAMINOSIS B6: Primary | ICD-10-CM

## 2024-10-31 ENCOUNTER — APPOINTMENT (OUTPATIENT)
Dept: ORTHOPEDIC SURGERY | Facility: HOSPITAL | Age: 54
End: 2024-10-31
Payer: COMMERCIAL

## 2024-12-02 ENCOUNTER — LAB (OUTPATIENT)
Dept: LAB | Facility: LAB | Age: 54
End: 2024-12-02
Payer: COMMERCIAL

## 2024-12-02 DIAGNOSIS — E67.2 HYPERVITAMINOSIS B6: ICD-10-CM

## 2024-12-02 PROCEDURE — 36415 COLL VENOUS BLD VENIPUNCTURE: CPT

## 2024-12-02 PROCEDURE — 84207 ASSAY OF VITAMIN B-6: CPT

## 2024-12-05 LAB — PYRIDOXAL PHOS SERPL-SCNC: 91.3 NMOL/L (ref 20–125)

## 2025-01-15 ENCOUNTER — APPOINTMENT (OUTPATIENT)
Dept: DERMATOLOGY | Facility: CLINIC | Age: 55
End: 2025-01-15
Payer: COMMERCIAL

## 2025-01-15 DIAGNOSIS — B07.8 OTHER VIRAL WARTS: Primary | ICD-10-CM

## 2025-01-15 PROCEDURE — 17110 DESTRUCTION B9 LES UP TO 14: CPT

## 2025-01-15 NOTE — PROGRESS NOTES
Subjective     Wilmer Alexander is a 54 y.o. male who presents for the following: Wart.   Patient is here today for a wart on the bottom of his left foot. He noticed it a couple months ago. He has tried sanding the wart down, no other prior treatments. Denies pain.       Review of Systems:  No other skin or systemic complaints other than what is documented elsewhere in the note.    The following portions of the chart were reviewed this encounter and updated as appropriate:   Allergies  Meds         Skin Cancer History  No skin cancer on file.      Specialty Problems          Dermatology Problems    Numerous moles        Objective   Well appearing patient in no apparent distress; mood and affect are within normal limits.    A focused skin examination was performed. All findings within normal limits unless otherwise noted below.    Assessment/Plan   1. Other viral warts  Left 4th Metatarsal Plantar Area  Verrucous papule with thrombosed capillaries.     -Discussed nature of condition  -Multiple treatments in office are often needed  -Diligent at home therapy is often needed  -Cryotherapy today  -Possible side effects of liquid nitrogen treatment reviewed including formation of blisters, crusting, tenderness, scar, and discoloration which may be permanent.  -Patient advised to return the office for re-evaluation if the treated lesion(s) do not resolve within 4-6 weeks. Patient verbalizes understanding.    Destr of lesion - Left 4th Metatarsal Plantar Area  Complexity: simple    Destruction method: cryotherapy    Informed consent: discussed and consent obtained    Lesion destroyed using liquid nitrogen: Yes    Cryotherapy cycles:  2  Outcome: patient tolerated procedure well with no complications    Post-procedure details: wound care instructions given        Follow up in 4-6 weeks.

## 2025-02-19 ENCOUNTER — APPOINTMENT (OUTPATIENT)
Dept: DERMATOLOGY | Facility: CLINIC | Age: 55
End: 2025-02-19
Payer: COMMERCIAL

## 2025-02-19 DIAGNOSIS — B07.8 OTHER VIRAL WARTS: Primary | ICD-10-CM

## 2025-02-19 PROCEDURE — 99212 OFFICE O/P EST SF 10 MIN: CPT

## 2025-02-19 NOTE — PROGRESS NOTES
Subjective     Wilmer Alexander is a 54 y.o. male who presents for the following: Wart. Patient is here for wart follow up. Treated with liquid nitrogen at his last visit on 1/15/2025. He has been treating at home with 17% salicylic acid daily. Reports improvement.     Review of Systems:  No other skin or systemic complaints other than what is documented elsewhere in the note.    The following portions of the chart were reviewed this encounter and updated as appropriate:   Allergies  Meds         Skin Cancer History  No skin cancer on file.      Specialty Problems          Dermatology Problems    Numerous moles        Objective   Well appearing patient in no apparent distress; mood and affect are within normal limits.    A focused skin examination was performed. All findings within normal limits unless otherwise noted below.    Assessment/Plan   1. Other viral warts  Left 4th Metatarsal Plantar Area  Clear on exam.     -The wart on the left foot has resolved.    -Follow up as needed.

## 2025-03-31 DIAGNOSIS — N52.9 ERECTILE DYSFUNCTION, UNSPECIFIED ERECTILE DYSFUNCTION TYPE: ICD-10-CM

## 2025-03-31 RX ORDER — TADALAFIL 10 MG/1
10 TABLET ORAL DAILY
Qty: 30 TABLET | Refills: 3 | Status: SHIPPED | OUTPATIENT
Start: 2025-03-31

## 2025-05-30 DIAGNOSIS — E78.5 HYPERLIPIDEMIA, UNSPECIFIED: ICD-10-CM

## 2025-06-02 RX ORDER — ROSUVASTATIN CALCIUM 10 MG/1
TABLET, COATED ORAL
Qty: 77 TABLET | Refills: 3 | Status: SHIPPED | OUTPATIENT
Start: 2025-06-02

## 2025-06-25 ENCOUNTER — APPOINTMENT (OUTPATIENT)
Dept: DERMATOLOGY | Facility: CLINIC | Age: 55
End: 2025-06-25
Payer: COMMERCIAL

## 2025-06-26 ENCOUNTER — OFFICE VISIT (OUTPATIENT)
Dept: PRIMARY CARE | Facility: CLINIC | Age: 55
End: 2025-06-26
Payer: COMMERCIAL

## 2025-06-26 VITALS
SYSTOLIC BLOOD PRESSURE: 118 MMHG | DIASTOLIC BLOOD PRESSURE: 80 MMHG | HEIGHT: 72 IN | BODY MASS INDEX: 33.59 KG/M2 | TEMPERATURE: 97.7 F | RESPIRATION RATE: 14 BRPM | HEART RATE: 74 BPM | WEIGHT: 248 LBS

## 2025-06-26 DIAGNOSIS — G45.3 AMAUROSIS FUGAX: ICD-10-CM

## 2025-06-26 DIAGNOSIS — H81.90 VESTIBULAR DIZZINESS: ICD-10-CM

## 2025-06-26 DIAGNOSIS — G45.9 TRANSIENT CEREBRAL ISCHEMIA, UNSPECIFIED TYPE: Primary | ICD-10-CM

## 2025-06-26 DIAGNOSIS — E78.5 DYSLIPIDEMIA: ICD-10-CM

## 2025-06-26 PROCEDURE — 3008F BODY MASS INDEX DOCD: CPT | Performed by: INTERNAL MEDICINE

## 2025-06-26 PROCEDURE — 1036F TOBACCO NON-USER: CPT | Performed by: INTERNAL MEDICINE

## 2025-06-26 PROCEDURE — 3074F SYST BP LT 130 MM HG: CPT | Performed by: INTERNAL MEDICINE

## 2025-06-26 PROCEDURE — 99214 OFFICE O/P EST MOD 30 MIN: CPT | Performed by: INTERNAL MEDICINE

## 2025-06-26 PROCEDURE — 3079F DIAST BP 80-89 MM HG: CPT | Performed by: INTERNAL MEDICINE

## 2025-06-26 RX ORDER — METHYLPREDNISOLONE 4 MG/1
TABLET ORAL
Qty: 21 TABLET | Refills: 0 | Status: SHIPPED | OUTPATIENT
Start: 2025-06-26 | End: 2025-07-02

## 2025-06-26 ASSESSMENT — ENCOUNTER SYMPTOMS
LOSS OF SENSATION IN FEET: 0
OCCASIONAL FEELINGS OF UNSTEADINESS: 0
DEPRESSION: 0

## 2025-06-26 ASSESSMENT — PAIN SCALES - GENERAL: PAINLEVEL_OUTOF10: 4

## 2025-06-26 NOTE — PROGRESS NOTES
Subjective   Wilmer Alexander is a 54 y.o. male who presents for patient is here for lost of vision rt eye transient .  HPI  Mauro is here for a physical exam, he is 53-year-old male with known history of essential hypertension, dyslipidemia history of GERD, ED, cholelithiasis status post laparoscopic cholecystectomy June 14, 2023 Dr. Bob.   Reports no major medical denies chest pain shortness of breath fever chill nausea vomiting constipation diarrhea dysuria urgency frequency, takes medication as prescribed. Lost vision rt eye for a period 30 seconds, no slurred speech no weakness, or facial disymetry  . Otherwise doing well .   Review of Systems  10 system review pertinent as above  Objective     Visit Vitals  /80   Pulse 74   Temp 36.5 °C (97.7 °F)   Resp 14        Physical Exam  HEENT: Atraumatic normocephalic the pupils are equal and round and reactive to light the sclerae nonicteric extraocular motion are intact.  Neck: Is supple without JVD no carotid bruits the trachea is midline there are no masses pulses are equal and bilateral with normal upstroke.  Skin: Normal.  Skin good texture.  Moist.  Good turgor.  No lesions, no rashes.  Lymph: No lymphadenopathy appreciated, no masses, no lesions  Lungs: Are clear to auscultation and percussion, good breath sounds bilaterally, no rhonchi, no wheezing, good diaphragmatic excursion.  Heart: Normal rate and normal rhythm S1, S2, no S3, no gallop, murmur or rub.  Abdomen: Soft, nontender, no organomegaly, good bowel sounds.    Extremities: Full range of motion, good pulses bilateral.  No cyanosis, no clubbing or edema.  Neuro: Cranial nerves II-XII are grossly intact there is no sensory or motor deficits.  Able to move all extremities.      Assessment/Plan     Transient loss of vision rt eye  05/25/25  Came now due to concerned  Ameurosis fugax rt  CT brain w/ and w/o  Carotid ultrasound       Numbness Sole and toes  Bilateral in the  settings  Spondylolisthesis  Was seen By KALYAN ( [facility])  EMG positive Periferal neuropathy  Will see Neurology   Normal Glucose, No Hx PVD  Normal Lipid panel.  B12, Folte    Immunizations  Flu vaccine pneumonia vaccine age 65  Shingles vaccine wishes to wait 2024 age 55  [place] vaccine 09/2024 [facility]  Flu Vaccine 2024  [facility]    Prevention  Colonoscopy 08/08/2023 dr Puente Next 2028  PSA 05/11/2023, PSA 2024    Continue with the low-fat, low-cholesterol diet,  I recommended Mediterranean diet, which include fish, chicken, vegetables and olive oil  Exercise daily for 30 minutes at least 3 times a week  Continue home medications    Hypertension  No added salt diet, do not and salt to your food  Try to exercise every other day for 30 minutes  Continue current medications    Gastroesophageal reflux disease  Continue current medications  Include spicy foods do not eat late at night  Do not wear tight fitting clothes  Exercise daily    Status post laparoscopic cholecystectomy June 14, 2023    SHANIKA  Nasal bipap  Using nightly    BMI 34.18 Kg/M sq  Gaining Wt   We spoke of diet and exercise    Hx Cholecystectomy  Doing better     Palpitation NS SVT  Was seen BY EP  Normal Stress test and Echo  Followed with his nurse Eric  Reduced Metoprolol still felt poorly  Now not taking   Occasional palpitation   Possible every other month  Asymptomatic no CP,SOB,Dizziness  Patient declined ablation Declined BB due to side effects    Periferal Neuropathy No claudication,  Was seen by [facility]  EMG with Periferal Neuropathy  With some weakness LLE  Check b12,FA, TSH  Will see neurology and Ortho spine referral   Normal Sress test Caduio, Nl Glucose.           Problem List Items Addressed This Visit    None  Visit Diagnoses         Transient cerebral ischemia, unspecified type    -  Primary    Relevant Orders    CT head w and wo IV contrast    Basic Metabolic Panel      Amaurosis fugax        Relevant Orders     Vascular US Carotid Artery Duplex Bilateral      Vestibular dizziness        Relevant Medications    methylPREDNISolone (Medrol Dospak) 4 mg tablets                Harris Rahman MD

## 2025-06-27 LAB
ALT SERPL W P-5'-P-CCNC: 44 U/L (ref 14–59)
ANION GAP SERPL CALC-SCNC: 14 MMOL/L (ref 10–20)
AST SERPL W P-5'-P-CCNC: 22 U/L (ref 15–37)
BUN SERPL-MCNC: 15 MG/DL (ref 7–18)
CALCIUM SERPL-MCNC: 10 MG/DL (ref 8.5–10.1)
CHLORIDE SERPL-SCNC: 101 MMOL/L (ref 98–107)
CHOLEST SERPL-MCNC: 160 MG/DL (ref 0–199)
CHOLESTEROL/HDL RATIO: 3.7 (ref 4.2–7)
CO2 SERPL-SCNC: 26 MMOL/L (ref 21–32)
CREAT SERPL-MCNC: 0.89 MG/DL (ref 0.6–1.1)
EGFRCR SERPLBLD CKD-EPI 2021: >90 ML/MIN/1.73M*2
GLUCOSE SERPL-MCNC: 83 MG/DL (ref 74–100)
HDLC SERPL-MCNC: 43 MG/DL (ref 40–59)
IS PATIENT FASTING: YES
LDLC SERPL DIRECT ASSAY-MCNC: 91 MG/DL (ref 0–100)
POTASSIUM SERPL-SCNC: 4.3 MMOL/L (ref 3.5–5.1)
SODIUM SERPL-SCNC: 137 MMOL/L (ref 136–145)
TRIGL SERPL-MCNC: 156 MG/DL

## 2025-06-30 ENCOUNTER — APPOINTMENT (OUTPATIENT)
Dept: ORTHOPEDIC SURGERY | Facility: CLINIC | Age: 55
End: 2025-06-30
Payer: COMMERCIAL

## 2025-07-09 ENCOUNTER — HOSPITAL ENCOUNTER (OUTPATIENT)
Dept: RADIOLOGY | Facility: CLINIC | Age: 55
Discharge: HOME | End: 2025-07-09
Payer: COMMERCIAL

## 2025-07-09 ENCOUNTER — APPOINTMENT (OUTPATIENT)
Dept: RADIOLOGY | Facility: CLINIC | Age: 55
End: 2025-07-09
Payer: COMMERCIAL

## 2025-07-09 DIAGNOSIS — G45.3 AMAUROSIS FUGAX: ICD-10-CM

## 2025-07-09 DIAGNOSIS — G45.9 TRANSIENT CEREBRAL ISCHEMIA, UNSPECIFIED TYPE: ICD-10-CM

## 2025-07-09 PROCEDURE — 93880 EXTRACRANIAL BILAT STUDY: CPT

## 2025-07-09 PROCEDURE — 70470 CT HEAD/BRAIN W/O & W/DYE: CPT | Performed by: RADIOLOGY

## 2025-07-09 PROCEDURE — 93880 EXTRACRANIAL BILAT STUDY: CPT | Performed by: RADIOLOGY

## 2025-07-09 PROCEDURE — 2550000001 HC RX 255 CONTRASTS: Performed by: INTERNAL MEDICINE

## 2025-07-09 PROCEDURE — 70470 CT HEAD/BRAIN W/O & W/DYE: CPT

## 2025-07-09 RX ADMIN — IOHEXOL 50 ML: 350 INJECTION, SOLUTION INTRAVENOUS at 14:05

## 2025-07-14 DIAGNOSIS — I10 BENIGN ESSENTIAL HTN: ICD-10-CM

## 2025-07-14 RX ORDER — LOSARTAN POTASSIUM 50 MG/1
75 TABLET ORAL DAILY
Qty: 135 TABLET | Refills: 3 | Status: SHIPPED | OUTPATIENT
Start: 2025-07-14

## 2025-08-01 DIAGNOSIS — I10 BENIGN ESSENTIAL HTN: ICD-10-CM

## 2025-08-01 RX ORDER — LOSARTAN POTASSIUM 50 MG/1
50 TABLET ORAL DAILY
Qty: 90 TABLET | Refills: 2 | Status: SHIPPED | OUTPATIENT
Start: 2025-08-01

## 2025-08-23 ENCOUNTER — APPOINTMENT (OUTPATIENT)
Dept: RADIOLOGY | Facility: HOSPITAL | Age: 55
End: 2025-08-23
Payer: COMMERCIAL

## 2025-08-23 ENCOUNTER — HOSPITAL ENCOUNTER (EMERGENCY)
Facility: HOSPITAL | Age: 55
Discharge: HOME | End: 2025-08-23
Payer: COMMERCIAL

## 2025-08-23 VITALS
RESPIRATION RATE: 18 BRPM | OXYGEN SATURATION: 96 % | HEIGHT: 72 IN | DIASTOLIC BLOOD PRESSURE: 68 MMHG | TEMPERATURE: 97.5 F | HEART RATE: 76 BPM | SYSTOLIC BLOOD PRESSURE: 122 MMHG | BODY MASS INDEX: 33.18 KG/M2 | WEIGHT: 245 LBS

## 2025-08-23 DIAGNOSIS — A35 TETANUS (HHS-HCC): ICD-10-CM

## 2025-08-23 DIAGNOSIS — R07.81 RIB PAIN: ICD-10-CM

## 2025-08-23 DIAGNOSIS — S22.42XA CLOSED FRACTURE OF MULTIPLE RIBS OF LEFT SIDE, INITIAL ENCOUNTER: ICD-10-CM
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
## 2025-08-23 DIAGNOSIS — T07.XXXA ABRASIONS OF MULTIPLE SITES: ICD-10-CM

## 2025-08-23 LAB
ALBUMIN SERPL BCP-MCNC: 4.1 G/DL (ref 3.4–5)
ALP SERPL-CCNC: 59 U/L (ref 33–120)
ALT SERPL W P-5'-P-CCNC: 21 U/L (ref 10–52)
ANION GAP SERPL CALC-SCNC: 10 MMOL/L (ref 10–20)
AST SERPL W P-5'-P-CCNC: 25 U/L (ref 9–39)
BASOPHILS # BLD AUTO: 0.05 X10*3/UL (ref 0–0.1)
BASOPHILS NFR BLD AUTO: 0.4 %
BILIRUB SERPL-MCNC: 0.6 MG/DL (ref 0–1.2)
BUN SERPL-MCNC: 19 MG/DL (ref 6–23)
CALCIUM SERPL-MCNC: 9.2 MG/DL (ref 8.6–10.3)
CHLORIDE SERPL-SCNC: 101 MMOL/L (ref 98–107)
CO2 SERPL-SCNC: 29 MMOL/L (ref 21–32)
CREAT SERPL-MCNC: 0.9 MG/DL (ref 0.5–1.3)
EGFRCR SERPLBLD CKD-EPI 2021: >90 ML/MIN/1.73M*2
EOSINOPHIL # BLD AUTO: 0.05 X10*3/UL (ref 0–0.7)
EOSINOPHIL NFR BLD AUTO: 0.4 %
ERYTHROCYTE [DISTWIDTH] IN BLOOD BY AUTOMATED COUNT: 12.7 % (ref 11.5–14.5)
GLUCOSE SERPL-MCNC: 116 MG/DL (ref 74–99)
HCT VFR BLD AUTO: 42.1 % (ref 41–52)
HGB BLD-MCNC: 14.4 G/DL (ref 13.5–17.5)
IMM GRANULOCYTES # BLD AUTO: 0.05 X10*3/UL (ref 0–0.7)
IMM GRANULOCYTES NFR BLD AUTO: 0.4 % (ref 0–0.9)
LACTATE SERPL-SCNC: 1.6 MMOL/L (ref 0.4–2)
LYMPHOCYTES # BLD AUTO: 1.6 X10*3/UL (ref 1.2–4.8)
LYMPHOCYTES NFR BLD AUTO: 11.9 %
MAGNESIUM SERPL-MCNC: 2.17 MG/DL (ref 1.6–2.4)
MCH RBC QN AUTO: 30.5 PG (ref 26–34)
MCHC RBC AUTO-ENTMCNC: 34.2 G/DL (ref 32–36)
MCV RBC AUTO: 89 FL (ref 80–100)
MONOCYTES # BLD AUTO: 0.9 X10*3/UL (ref 0.1–1)
MONOCYTES NFR BLD AUTO: 6.7 %
NEUTROPHILS # BLD AUTO: 10.76 X10*3/UL (ref 1.2–7.7)
NEUTROPHILS NFR BLD AUTO: 80.2 %
NRBC BLD-RTO: 0 /100 WBCS (ref 0–0)
PLATELET # BLD AUTO: 274 X10*3/UL (ref 150–450)
POTASSIUM SERPL-SCNC: 4.6 MMOL/L (ref 3.5–5.3)
PROT SERPL-MCNC: 6.6 G/DL (ref 6.4–8.2)
RBC # BLD AUTO: 4.72 X10*6/UL (ref 4.5–5.9)
SODIUM SERPL-SCNC: 135 MMOL/L (ref 136–145)
WBC # BLD AUTO: 13.4 X10*3/UL (ref 4.4–11.3)

## 2025-08-23 PROCEDURE — 80053 COMPREHEN METABOLIC PANEL: CPT | Performed by: NURSE PRACTITIONER

## 2025-08-23 PROCEDURE — 96374 THER/PROPH/DIAG INJ IV PUSH: CPT | Mod: 59

## 2025-08-23 PROCEDURE — 71101 X-RAY EXAM UNILAT RIBS/CHEST: CPT | Mod: LEFT SIDE | Performed by: STUDENT IN AN ORGANIZED HEALTH CARE EDUCATION/TRAINING PROGRAM

## 2025-08-23 PROCEDURE — 83735 ASSAY OF MAGNESIUM: CPT | Performed by: NURSE PRACTITIONER

## 2025-08-23 PROCEDURE — 73502 X-RAY EXAM HIP UNI 2-3 VIEWS: CPT | Mod: LEFT SIDE | Performed by: STUDENT IN AN ORGANIZED HEALTH CARE EDUCATION/TRAINING PROGRAM

## 2025-08-23 PROCEDURE — 90715 TDAP VACCINE 7 YRS/> IM: CPT | Performed by: NURSE PRACTITIONER

## 2025-08-23 PROCEDURE — 2500000004 HC RX 250 GENERAL PHARMACY W/ HCPCS (ALT 636 FOR OP/ED): Mod: JZ

## 2025-08-23 PROCEDURE — 2550000001 HC RX 255 CONTRASTS: Performed by: NURSE PRACTITIONER

## 2025-08-23 PROCEDURE — 73552 X-RAY EXAM OF FEMUR 2/>: CPT | Mod: LT

## 2025-08-23 PROCEDURE — 96376 TX/PRO/DX INJ SAME DRUG ADON: CPT | Mod: 59

## 2025-08-23 PROCEDURE — 36415 COLL VENOUS BLD VENIPUNCTURE: CPT | Performed by: NURSE PRACTITIONER

## 2025-08-23 PROCEDURE — 71101 X-RAY EXAM UNILAT RIBS/CHEST: CPT | Mod: LT

## 2025-08-23 PROCEDURE — 85025 COMPLETE CBC W/AUTO DIFF WBC: CPT | Performed by: NURSE PRACTITIONER

## 2025-08-23 PROCEDURE — 99285 EMERGENCY DEPT VISIT HI MDM: CPT | Mod: 25

## 2025-08-23 PROCEDURE — 83605 ASSAY OF LACTIC ACID: CPT | Performed by: NURSE PRACTITIONER

## 2025-08-23 PROCEDURE — 71260 CT THORAX DX C+: CPT

## 2025-08-23 PROCEDURE — 96375 TX/PRO/DX INJ NEW DRUG ADDON: CPT | Mod: 59

## 2025-08-23 PROCEDURE — 73552 X-RAY EXAM OF FEMUR 2/>: CPT | Mod: LEFT SIDE

## 2025-08-23 PROCEDURE — 73502 X-RAY EXAM HIP UNI 2-3 VIEWS: CPT | Mod: LT

## 2025-08-23 PROCEDURE — 74177 CT ABD & PELVIS W/CONTRAST: CPT

## 2025-08-23 PROCEDURE — 90471 IMMUNIZATION ADMIN: CPT | Performed by: NURSE PRACTITIONER

## 2025-08-23 PROCEDURE — 2500000004 HC RX 250 GENERAL PHARMACY W/ HCPCS (ALT 636 FOR OP/ED): Performed by: NURSE PRACTITIONER

## 2025-08-23 PROCEDURE — 96361 HYDRATE IV INFUSION ADD-ON: CPT

## 2025-08-23 RX ORDER — KETOROLAC TROMETHAMINE 10 MG/1
10 TABLET, FILM COATED ORAL EVERY 6 HOURS PRN
Qty: 20 TABLET | Refills: 0 | Status: SHIPPED | OUTPATIENT
Start: 2025-08-23 | End: 2025-08-28

## 2025-08-23 RX ORDER — ONDANSETRON HYDROCHLORIDE 2 MG/ML
4 INJECTION, SOLUTION INTRAVENOUS ONCE
Status: COMPLETED | OUTPATIENT
Start: 2025-08-23 | End: 2025-08-23

## 2025-08-23 RX ORDER — HYDROMORPHONE HYDROCHLORIDE 1 MG/ML
INJECTION, SOLUTION INTRAMUSCULAR; INTRAVENOUS; SUBCUTANEOUS
Status: COMPLETED
Start: 2025-08-23 | End: 2025-08-23

## 2025-08-23 RX ORDER — HYDROMORPHONE HYDROCHLORIDE 1 MG/ML
1 INJECTION, SOLUTION INTRAMUSCULAR; INTRAVENOUS; SUBCUTANEOUS ONCE
Status: COMPLETED | OUTPATIENT
Start: 2025-08-23 | End: 2025-08-23

## 2025-08-23 RX ORDER — OXYCODONE HYDROCHLORIDE 5 MG/1
10 TABLET ORAL ONCE
Refills: 0 | Status: DISCONTINUED | OUTPATIENT
Start: 2025-08-23 | End: 2025-08-23 | Stop reason: HOSPADM

## 2025-08-23 RX ORDER — ONDANSETRON 4 MG/1
4 TABLET, FILM COATED ORAL EVERY 8 HOURS PRN
Qty: 12 TABLET | Refills: 0 | Status: SHIPPED | OUTPATIENT
Start: 2025-08-23 | End: 2025-08-27

## 2025-08-23 RX ORDER — BACITRACIN 500 [USP'U]/G
OINTMENT TOPICAL ONCE
Status: DISCONTINUED | OUTPATIENT
Start: 2025-08-23 | End: 2025-08-23 | Stop reason: HOSPADM

## 2025-08-23 RX ORDER — LIDOCAINE 50 MG/G
2 PATCH TOPICAL DAILY
Qty: 30 PATCH | Refills: 0 | Status: SHIPPED | OUTPATIENT
Start: 2025-08-23 | End: 2025-08-27 | Stop reason: SDUPTHER

## 2025-08-23 RX ORDER — KETOROLAC TROMETHAMINE 30 MG/ML
30 INJECTION, SOLUTION INTRAMUSCULAR; INTRAVENOUS ONCE
Status: COMPLETED | OUTPATIENT
Start: 2025-08-23 | End: 2025-08-23

## 2025-08-23 RX ORDER — OXYCODONE HYDROCHLORIDE 5 MG/1
5 TABLET ORAL EVERY 6 HOURS PRN
Qty: 15 TABLET | Refills: 0 | Status: SHIPPED | OUTPATIENT
Start: 2025-08-23 | End: 2025-08-27

## 2025-08-23 RX ADMIN — HYDROMORPHONE HYDROCHLORIDE 1 MG: 1 INJECTION, SOLUTION INTRAMUSCULAR; INTRAVENOUS; SUBCUTANEOUS at 14:56

## 2025-08-23 RX ADMIN — IOHEXOL 75 ML: 350 INJECTION, SOLUTION INTRAVENOUS at 16:10

## 2025-08-23 RX ADMIN — SODIUM CHLORIDE 1000 ML: 0.9 INJECTION, SOLUTION INTRAVENOUS at 14:37

## 2025-08-23 RX ADMIN — ONDANSETRON 4 MG: 2 INJECTION, SOLUTION INTRAMUSCULAR; INTRAVENOUS at 14:55

## 2025-08-23 RX ADMIN — HYDROMORPHONE HYDROCHLORIDE 1 MG: 1 INJECTION, SOLUTION INTRAMUSCULAR; INTRAVENOUS; SUBCUTANEOUS at 17:49

## 2025-08-23 RX ADMIN — HYDROMORPHONE HYDROCHLORIDE 1 MG: 1 INJECTION, SOLUTION INTRAMUSCULAR; INTRAVENOUS; SUBCUTANEOUS at 15:40

## 2025-08-23 RX ADMIN — TETANUS TOXOID, REDUCED DIPHTHERIA TOXOID AND ACELLULAR PERTUSSIS VACCINE, ADSORBED 0.5 ML: 5; 2.5; 8; 8; 2.5 SUSPENSION INTRAMUSCULAR at 14:39

## 2025-08-23 RX ADMIN — KETOROLAC TROMETHAMINE 30 MG: 30 INJECTION, SOLUTION INTRAMUSCULAR at 14:55

## 2025-08-23 ASSESSMENT — PAIN DESCRIPTION - FREQUENCY: FREQUENCY: CONSTANT/CONTINUOUS

## 2025-08-23 ASSESSMENT — PAIN DESCRIPTION - PAIN TYPE: TYPE: ACUTE PAIN

## 2025-08-23 ASSESSMENT — LIFESTYLE VARIABLES
TOTAL SCORE: 0
EVER HAD A DRINK FIRST THING IN THE MORNING TO STEADY YOUR NERVES TO GET RID OF A HANGOVER: NO
HAVE YOU EVER FELT YOU SHOULD CUT DOWN ON YOUR DRINKING: NO
EVER FELT BAD OR GUILTY ABOUT YOUR DRINKING: NO
HAVE PEOPLE ANNOYED YOU BY CRITICIZING YOUR DRINKING: NO

## 2025-08-23 ASSESSMENT — PAIN - FUNCTIONAL ASSESSMENT: PAIN_FUNCTIONAL_ASSESSMENT: 0-10

## 2025-08-23 ASSESSMENT — PAIN SCALES - GENERAL: PAINLEVEL_OUTOF10: 7

## 2025-08-23 ASSESSMENT — PAIN DESCRIPTION - LOCATION: LOCATION: RIB CAGE

## 2025-08-23 ASSESSMENT — PAIN DESCRIPTION - ONSET: ONSET: ONGOING

## 2025-08-23 ASSESSMENT — PAIN DESCRIPTION - ORIENTATION: ORIENTATION: LEFT

## 2025-08-23 ASSESSMENT — PAIN DESCRIPTION - DESCRIPTORS: DESCRIPTORS: ACHING;DISCOMFORT

## 2025-08-27 ENCOUNTER — OFFICE VISIT (OUTPATIENT)
Dept: PRIMARY CARE | Facility: CLINIC | Age: 55
End: 2025-08-27
Payer: COMMERCIAL

## 2025-08-27 ENCOUNTER — APPOINTMENT (OUTPATIENT)
Dept: PRIMARY CARE | Facility: CLINIC | Age: 55
End: 2025-08-27
Payer: COMMERCIAL

## 2025-08-27 VITALS
RESPIRATION RATE: 16 BRPM | TEMPERATURE: 97.7 F | DIASTOLIC BLOOD PRESSURE: 84 MMHG | BODY MASS INDEX: 33.23 KG/M2 | HEIGHT: 72 IN | SYSTOLIC BLOOD PRESSURE: 126 MMHG | HEART RATE: 74 BPM

## 2025-08-27 DIAGNOSIS — T07.XXXA ABRASIONS OF MULTIPLE SITES: ICD-10-CM

## 2025-08-27 DIAGNOSIS — A35 TETANUS (HHS-HCC): ICD-10-CM

## 2025-08-27 DIAGNOSIS — S70.02XD CONTUSION OF LEFT HIP, SUBSEQUENT ENCOUNTER: Primary | ICD-10-CM

## 2025-08-27 DIAGNOSIS — S22.42XA CLOSED FRACTURE OF MULTIPLE RIBS OF LEFT SIDE, INITIAL ENCOUNTER: ICD-10-CM
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
## 2025-08-27 DIAGNOSIS — R07.81 RIB PAIN: ICD-10-CM

## 2025-08-27 PROBLEM — S70.02XA CONTUSION OF LEFT HIP: Status: ACTIVE | Noted: 2025-08-27

## 2025-08-27 PROCEDURE — 1036F TOBACCO NON-USER: CPT | Performed by: INTERNAL MEDICINE

## 2025-08-27 PROCEDURE — 3074F SYST BP LT 130 MM HG: CPT | Performed by: INTERNAL MEDICINE

## 2025-08-27 PROCEDURE — 3079F DIAST BP 80-89 MM HG: CPT | Performed by: INTERNAL MEDICINE

## 2025-08-27 PROCEDURE — 99214 OFFICE O/P EST MOD 30 MIN: CPT | Performed by: INTERNAL MEDICINE

## 2025-08-27 RX ORDER — LIDOCAINE 50 MG/G
2 PATCH TOPICAL DAILY
Qty: 30 PATCH | Refills: 0 | Status: SHIPPED | OUTPATIENT
Start: 2025-08-27

## 2025-08-27 ASSESSMENT — PAIN SCALES - GENERAL: PAINLEVEL_OUTOF10: 6

## 2025-08-27 ASSESSMENT — ENCOUNTER SYMPTOMS
LOSS OF SENSATION IN FEET: 0
DEPRESSION: 0
OCCASIONAL FEELINGS OF UNSTEADINESS: 0

## 2025-09-02 ENCOUNTER — APPOINTMENT (OUTPATIENT)
Dept: ORTHOPEDIC SURGERY | Facility: HOSPITAL | Age: 55
End: 2025-09-02
Payer: COMMERCIAL

## 2025-09-05 ENCOUNTER — APPOINTMENT (OUTPATIENT)
Dept: ORTHOPEDIC SURGERY | Facility: HOSPITAL | Age: 55
End: 2025-09-05
Payer: COMMERCIAL

## 2025-09-22 ENCOUNTER — APPOINTMENT (OUTPATIENT)
Dept: PRIMARY CARE | Facility: CLINIC | Age: 55
End: 2025-09-22
Payer: COMMERCIAL

## 2025-10-13 ENCOUNTER — APPOINTMENT (OUTPATIENT)
Dept: PRIMARY CARE | Facility: CLINIC | Age: 55
End: 2025-10-13
Payer: COMMERCIAL

## 2025-10-15 ENCOUNTER — APPOINTMENT (OUTPATIENT)
Dept: DERMATOLOGY | Facility: CLINIC | Age: 55
End: 2025-10-15
Payer: COMMERCIAL